# Patient Record
Sex: FEMALE | Race: WHITE | NOT HISPANIC OR LATINO | Employment: FULL TIME | URBAN - METROPOLITAN AREA
[De-identification: names, ages, dates, MRNs, and addresses within clinical notes are randomized per-mention and may not be internally consistent; named-entity substitution may affect disease eponyms.]

---

## 2019-10-01 ENCOUNTER — CLINICAL SUPPORT (OUTPATIENT)
Dept: OTHER | Facility: CLINIC | Age: 24
End: 2019-10-01
Payer: COMMERCIAL

## 2019-10-01 DIAGNOSIS — Z00.8 ENCOUNTER FOR OTHER GENERAL EXAMINATION: ICD-10-CM

## 2019-10-01 PROCEDURE — 99401 PREV MED CNSL INDIV APPRX 15: CPT | Mod: S$GLB,,, | Performed by: INTERNAL MEDICINE

## 2019-10-01 PROCEDURE — 80061 PR  LIPID PANEL: ICD-10-PCS | Mod: QW,S$GLB,, | Performed by: INTERNAL MEDICINE

## 2019-10-01 PROCEDURE — 82947 ASSAY GLUCOSE BLOOD QUANT: CPT | Mod: QW,S$GLB,, | Performed by: INTERNAL MEDICINE

## 2019-10-01 PROCEDURE — 80061 LIPID PANEL: CPT | Mod: QW,S$GLB,, | Performed by: INTERNAL MEDICINE

## 2019-10-01 PROCEDURE — 82947 PR  ASSAY QUANTITATIVE,BLOOD GLUCOSE: ICD-10-PCS | Mod: QW,S$GLB,, | Performed by: INTERNAL MEDICINE

## 2019-10-01 PROCEDURE — 99401 PR PREVENT COUNSEL,INDIV,15 MIN: ICD-10-PCS | Mod: S$GLB,,, | Performed by: INTERNAL MEDICINE

## 2019-10-02 VITALS — HEIGHT: 66 IN

## 2019-10-02 LAB
GLUCOSE SERPL-MCNC: 93 MG/DL (ref 60–140)
HDLC SERPL-MCNC: 59 MG/DL
POC CHOLESTEROL, LDL (DOCK): 97 MG/DL
POC CHOLESTEROL, TOTAL: 173 MG/DL
TRIGL SERPL-MCNC: 87 MG/DL

## 2022-04-25 ENCOUNTER — TELEPHONE (OUTPATIENT)
Dept: OBSTETRICS AND GYNECOLOGY | Facility: CLINIC | Age: 27
End: 2022-04-25
Payer: COMMERCIAL

## 2022-04-25 NOTE — TELEPHONE ENCOUNTER
----- Message from Geraldine Patricio sent at 4/22/2022  4:49 PM CDT -----   Type:  Needs Medical Advice    Who Called: PETER ZEE [[id number]]  Symptoms (please be specific):  How long has patient had these symptoms:    Pharmacy name and phone #:    Would the patient rather a call back or a response via [facility]?  Best Call Back Number:  [phone]  Additional Information:  Pt is requesting a call from office regarding schedule New Preg appt. Please call

## 2022-04-25 NOTE — TELEPHONE ENCOUNTER
----- Message from Crista Gonsales sent at 4/25/2022  7:44 AM CDT -----  States she is 8 to 9 weeks pregnant. She would like to schedule an appt, before the 8 to 10 weeks. Please call pt [phone]. Thank you

## 2023-07-25 DIAGNOSIS — M25.562 LEFT KNEE PAIN, UNSPECIFIED CHRONICITY: Primary | ICD-10-CM

## 2025-01-05 ENCOUNTER — ON-DEMAND VIRTUAL (OUTPATIENT)
Dept: URGENT CARE | Facility: CLINIC | Age: 30
End: 2025-01-05
Payer: MEDICAID

## 2025-01-05 DIAGNOSIS — J32.9 BACTERIAL SINUSITIS: Primary | ICD-10-CM

## 2025-01-05 DIAGNOSIS — B96.89 BACTERIAL SINUSITIS: Primary | ICD-10-CM

## 2025-01-05 RX ORDER — AMOXICILLIN 875 MG/1
875 TABLET, FILM COATED ORAL EVERY 12 HOURS
Qty: 14 TABLET | Refills: 0 | Status: SHIPPED | OUTPATIENT
Start: 2025-01-05 | End: 2025-01-12

## 2025-01-05 NOTE — PROGRESS NOTES
Subjective:      Patient ID: Samuel Marie is a 29 y.o. female.    Vitals:  vitals were not taken for this visit.     Chief Complaint: Sinus Problem and Cough      Visit Type: TELE AUDIOVISUAL - This visit was conducted virtually based on  subjective information and limited objective exam    Present with the patient at the time of consultation: TELEMED PRESENT WITH PATIENT: None  LOCATION OF PATIENT [facility]  Two patient identifiers used to verify patient- saying out date of birth and full name.       Past Medical History:   Diagnosis Date    Abnormal Pap smear of cervix     Abnormal Pap/LEEP 2015 (Dr. Lopez)    Anxiety     COVID-19 virus infection 07/2022    HSV-2 infection      Past Surgical History:   Procedure Laterality Date    DILATION AND CURETTAGE OF UTERUS      HAND SURGERY      HYMENECTOMY      LOOP ELECTROSURGICAL EXCISION PROCEDURE (LEEP)      2015 with Dr. Lopez     Review of patient's allergies indicates:  No Known Allergies  Current Outpatient Medications on File Prior to Visit   Medication Sig Dispense Refill    pantoprazole (PROTONIX) 40 MG tablet Take 1 tablet (40 mg total) by mouth once daily. 30 tablet 3    valACYclovir (VALTREX) 500 MG tablet TAKE 1 TABLET BY MOUTH ONCE A DAY 30 tablet 3     No current facility-administered medications on file prior to visit.     Family History   Problem Relation Name Age of Onset    Other Mother          Cervical Dysplasia       Medications Ordered                [facility], [facility] - [place] - [address] [id number]   [address] [address] [place] [address]    Telephone: [phone]   Fax: [phone]   Hours: Not open 24 hours                         E-Prescribed (1 of 1)              amoxicillin (AMOXIL) 875 MG tablet    Sig: Take 1 tablet (875 mg total) by mouth every 12 (twelve) hours. for 7 days       Start: 1/5/25     Quantity: 14 tablet Refills: 0                           Oh[facility] Peq Odvv Intake    1/5/2025 10:14 AM CST - Filed by Patient    What is your current physical address in the event of a medical emergency? [address] [address]   Are you able to take your vital signs? No   Please attach any relevant images or files    Is your employer contracted with [facility]? No         28 yo female with c/o congestion and green secretions. She states she has headache, persistent cough and fever for one week. She denies sob and wheezing. She states she feels some wheezing at night when lying down. She is 27 weeks pregnant so has not tried anything. She denies sore throat. She states she did a first.         Constitution: Negative for fever and generalized weakness.   HENT:  Positive for ear pain, congestion, postnasal drip, sinus pain and sinus pressure. Negative for sore throat.    Cardiovascular:  Negative for sob on exertion.   Respiratory:  Positive for cough. Negative for sputum production, shortness of breath and wheezing.    Allergic/Immunologic: Positive for sneezing.   Neurological:  Negative for headaches.        Objective:   The physical exam was conducted virtually.    AAO x 3 ; no acute distress noted; appearance normal; mood and behavior normal; thought process normal  Head- normocephalic  Nose- appears normal, no discharge or erythema  Eyes- pupils appear normal in size, no drainage, no erythema  Ears- normal appearing; no discharge, no erythema  Mouth- appears normal  Oropharynx- no erythema, lesions  Lungs- breathing at a normal rate, no acute distress noted  Heart- no reports of tachycardia, palpitations, chest pain  Abdomen- non distended, non tender reported by patient  Skin- warm and dry, no erythema or edema noted by patient or visualized  Psych- as above; no si/hi      Assessment:     1. Bacterial sinusitis        Plan:     PLEASE VERIFY WITH YOUR OB-GYN BEFORE STARTING ANY NEW MEDICATIONS!!    Caution: During the first 12 weeks of pregnancy, use all medications sparingly, if at all.    Acceptable Over the Counter  Medications During Pregnancy:    Allergic Reaction: Benadryl, Benadryl Cream, Zyrtec, Claritin, Allegra, Flonase    Antacid/Heartburn: Maalox, Mylanta, Gaviscon, Tums, Pepcid, Prilosec OTC, Tagamet    Congestion/Sinusitis: Sudafed, Actifed, Claritin products, Mucinex products    Constipation: Milk of Magnesia, Colace, Metamucil, Senokot(plain), Fibercon, Miralax, fluid intake, fruit juices, exercise    Cough: Robitussin DM, Robitussin PE    Diarrhea: Kaopectate, Imodium    Enema: Fleets    Fever: Tylenol(regular or extra strength - take 2 every 6 hours)    Gas: Phazyme 125, Mylicon 80, Gas-X    Headache: Tylenol(regular or extra strength - take 2 every 6 hours)    Hemorrhoids: Preparation H, Anusol HC cream 1%, Tucks Pads    Motion Sickness: Dramamine, Benadryl (flying)    Muscle Aches: Icy Hot patches (not on abdomen), Thermacare patches (not on abdomen)    Nausea: Unisom, Emetrol, Vitamin B6, cold sugary fluids, devonte products    Pain: Tylenol(regular or extra strength - take 2 every 6 hours)    Rashes/ Bug Bites: Cortaid, Lanacort (any % Hydrocortisone Cream), Benadryl (cream or tablets), Calamine Lotion    Sleep: Unisom, Benadryl    Sore Throat: Sucrets, Cepacol(spray or lozenges), Chloraseptic ( spray or cough drops), gargle with warm salt water, Tylenol    Yeast Infections: Monistat, Gyne-Lotrimin (avoid in 1st trimester if possible)        Thank you for choosing [facility]!    Our goal in the [facility] is to always provide outstanding medical care. You may receive a survey by mail or e-mail in the next week regarding your experience today. We would greatly appreciate you completing and returning the survey. Your feedback provides us with a way to recognize our staff who provide very good care, and it helps us learn how to improve when your experience was below our aspiration of excellence.         We appreciate you trusting us with your medical care. We hope you feel  better soon. We will be happy to take care of you for all of your future medical needs.    You must understand that you've received an Urgent Care treatment only and that you may be released before all your medical problems are known or treated. You, the patient, will arrange for follow up care as instructed.    Follow up with your PCP or specialty clinic as directed in the next 1-2 weeks if not improved or as needed.  You can call [phone] to schedule an appointment with the appropriate provider.    If your condition worsens we recommend that you receive another evaluation in person, with your primary care provider, urgent care or at the emergency room immediately or contact your primary medical clinics after hours call service to discuss your concerns.         Bacterial sinusitis  -     amoxicillin (AMOXIL) 875 MG tablet; Take 1 tablet (875 mg total) by mouth every 12 (twelve) hours. for 7 days  Dispense: 14 tablet; Refill: 0